# Patient Record
Sex: MALE | Race: WHITE | URBAN - METROPOLITAN AREA
[De-identification: names, ages, dates, MRNs, and addresses within clinical notes are randomized per-mention and may not be internally consistent; named-entity substitution may affect disease eponyms.]

---

## 2021-11-22 ENCOUNTER — OFFICE VISIT (OUTPATIENT)
Dept: CARDIOLOGY CLINIC | Age: 41
End: 2021-11-22
Payer: COMMERCIAL

## 2021-11-22 VITALS
SYSTOLIC BLOOD PRESSURE: 112 MMHG | HEIGHT: 73 IN | HEART RATE: 67 BPM | OXYGEN SATURATION: 97 % | WEIGHT: 233 LBS | BODY MASS INDEX: 30.88 KG/M2 | DIASTOLIC BLOOD PRESSURE: 70 MMHG

## 2021-11-22 DIAGNOSIS — R07.9 CHEST PAIN, UNSPECIFIED TYPE: Primary | ICD-10-CM

## 2021-11-22 PROCEDURE — 93000 ELECTROCARDIOGRAM COMPLETE: CPT | Performed by: INTERNAL MEDICINE

## 2021-11-22 PROCEDURE — 99204 OFFICE O/P NEW MOD 45 MIN: CPT | Performed by: INTERNAL MEDICINE

## 2021-11-22 NOTE — PROGRESS NOTES
[facility]    Mr. Perez [facility] is 69-year-old male with no significant past medical history. He denies any prior history of MI or CHF. Over last 10 years patient has been experiencing repeated episodes of chest pain. Sometimes it feels like it is tight sensation. He has been told in the past that he has costochondritis. This episodes are random and happens at rest.  There is no exertional component. There is no radiation. Sometimes last for seconds sometimes for longer period of time. There is no associated nausea vomiting or diaphoresis. He has been recently seen by GI team and has been told that he may have irritable bowel syndrome. He denies any PND or lower spitty swelling he denies any palpitation, presyncope or syncope  Denies any nausea, vomiting, abdominal pain, fever, chills, sputum production. No hematuria or other bleeding complaints    History reviewed. No pertinent past medical history. Review of Systems:  Cardiac symptoms as noted above in HPI. All others negative. Denies fatigue, malaise, skin rash, joint pain, blurring vision, photophobia, neck pain, hemoptysis, chronic cough, nausea, vomiting, hematuria, burning micturition, BRBPR, chronic headaches. No current outpatient medications on file. No current facility-administered medications for this visit. History reviewed. No pertinent surgical history. Allergies and Sensitivities:  No Known Allergies    Family History:  History reviewed. No pertinent family history. Social History:  Social History     Tobacco Use    Smoking status: Never Smoker    Smokeless tobacco: Never Used   Vaping Use    Vaping Use: Not on file   Substance Use Topics    Alcohol use: Not Currently    Drug use: Not Currently     He  reports that he has never smoked. He has never used smokeless tobacco.  He  reports previous alcohol use.     Physical Exam:  BP Readings from Last 3 Encounters:   21 112/70         Pulse Readings from Last 3 Encounters:   21 67          Wt Readings from Last 3 Encounters:   21 105.7 kg (233 lb)       Constitutional: Oriented to person, place, and time. HENT: Head: Normocephalic and atraumatic. Eyes: Conjunctivae and extraocular motions are normal.   Neck: No JVD present. Carotid bruit is not appreciated. Cardiovascular: Regular rhythm. No murmur, gallop or rubs appreciated  Lung: Breath sounds normal. No respiratory distress. No ronchi or rales appreciated  Abdominal: No tenderness. No rebound and no guarding. Musculoskeletal: There is no lower extremity edema. No cynosis  Lymphadenopathy:  No cervical or supraclavicular adenopathy appriciated. Neurological: No gross motor deficit noted. Skin: No visible skin rash noted. No Ear discharge noted  Psychiatric: Normal mood and affect. LABS:   @No results found for: WBC, WBCLT, HGBPOC, HGB, HGBP, HCTPOC, HCT, PHCT, RBCH, PLT, MCV, HGBEXT, HCTEXT, PLTEXT  No results found for: NA, K, CL, CO2, GLU, BUN, CREA  No flowsheet data found. No results found for: ALT  No results found for: HBA1C, GIN0TFQS, WZR7JAVW  No results found for: TSH, TSH2, TSH3, TSHP, TSHEXT    EK2021: Sinus rhythm at 67 bpm.  No ST changes of ischemia. Normal IA and QRS interval    STRESS TEST (EST, PHARM, NUC, ECHO etc)    CATHETERIZATION    IMPRESSION & PLAN:  Mr. Jaymie Castelan is a 22-year-old male    Chest pain:  Somewhat atypical for angina. Ongoing for more than 10 years and without any exertional symptoms. We will proceed with treadmill stress test for risk stratification  We will proceed with echo to rule out abnormal cardiac structure or pericardial disease  Reassured patient that he has no EKG changes and he has no risk factor to have underlying coronary artery disease. Importance of diet and exercise was discussed with patient.     This plan was discussed with patient who is in agreement. Thank you for allowing me to participate in patient care. Please feel free to call me if you have any question or concern. Theresa Reinoso MD  Please note: This document has been produced using voice recognition software. Unrecognized errors in transcription may be present.

## 2021-11-22 NOTE — LETTER
11/22/2021    Patient: Holden Arroyo   YOB: 1980   Date of Visit: 11/22/2021     Tod Zuñiga MD  [address] [address]  Via Fax: [phone]    Dear Tod Zuñiga MD,      Thank you for referring Mr. Lourdes Dominguez to [address] [place] [address] for evaluation. My notes for this consultation are attached. If you have questions, please do not hesitate to call me. I look forward to following your patient along with you.       Sincerely,    Erich Cedeño MD

## 2022-01-03 ENCOUNTER — TELEPHONE (OUTPATIENT)
Dept: CARDIOLOGY CLINIC | Age: 42
End: 2022-01-03

## 2022-01-04 ENCOUNTER — TELEPHONE (OUTPATIENT)
Dept: CARDIOLOGY CLINIC | Age: 42
End: 2022-01-04

## 2022-02-18 ENCOUNTER — TELEPHONE (OUTPATIENT)
Dept: CARDIOLOGY CLINIC | Age: 42
End: 2022-02-18

## 2022-02-28 ENCOUNTER — OFFICE VISIT (OUTPATIENT)
Dept: CARDIOLOGY CLINIC | Age: 42
End: 2022-02-28
Payer: COMMERCIAL

## 2022-02-28 VITALS
WEIGHT: 237 LBS | HEART RATE: 68 BPM | SYSTOLIC BLOOD PRESSURE: 112 MMHG | BODY MASS INDEX: 31.41 KG/M2 | OXYGEN SATURATION: 98 % | DIASTOLIC BLOOD PRESSURE: 64 MMHG | HEIGHT: 73 IN

## 2022-02-28 DIAGNOSIS — R07.9 CHEST PAIN, UNSPECIFIED TYPE: Primary | ICD-10-CM

## 2022-02-28 PROBLEM — H52.209 ASTIGMATISM: Status: ACTIVE | Noted: 2022-02-28

## 2022-02-28 PROBLEM — R19.5 LOOSE STOOLS: Status: ACTIVE | Noted: 2021-10-20

## 2022-02-28 PROCEDURE — 99213 OFFICE O/P EST LOW 20 MIN: CPT | Performed by: INTERNAL MEDICINE

## 2022-02-28 RX ORDER — DICYCLOMINE HYDROCHLORIDE 20 MG/1
20 TABLET ORAL
COMMUNITY
Start: 2022-02-18 | End: 2022-05-19

## 2022-02-28 RX ORDER — ERGOCALCIFEROL 1.25 MG/1
CAPSULE ORAL
COMMUNITY
Start: 2022-02-03

## 2022-02-28 NOTE — PROGRESS NOTES
Marcia Ramos presents today for   Chief Complaint   Patient presents with    Follow-up     3 months        Marcia Ramos preferred language for health care discussion is english/other. Is someone accompanying this pt? no    Is the patient using any DME equipment during [address]? no    Depression Screening:  3 most recent PHQ Screens 2/28/2022   Little interest or pleasure in doing things Not at all   Feeling down, depressed, irritable, or hopeless Not at all   Total Score PHQ 2 0       Learning Assessment:  Learning Assessment 11/22/2021   PRIMARY LEARNER Patient   HIGHEST LEVEL OF EDUCATION - PRIMARY LEARNER  4 YEARS OF COLLEGE   BARRIERS PRIMARY LEARNER NONE   PRIMARY LANGUAGE ENGLISH   LEARNER PREFERENCE PRIMARY DEMONSTRATION   ANSWERED BY patient   RELATIONSHIP SELF       Abuse Screening:  Abuse Screening Questionnaire 11/22/2021   Do you ever feel afraid of your partner? N   Are you in a relationship with someone who physically or mentally threatens you? N   Is it safe for you to go home? Y       Pt currently taking Anticoagulant therapy? no    Coordination of Care:  1. Have you been to the ER, urgent care clinic since your last visit? Hospitalized since your last visit? no    2. Have you seen or consulted any other health care providers outside of the [address] [place] [address] since your last visit? Include any pap smears or colon screening.  no

## 2022-02-28 NOTE — PROGRESS NOTES
[facility]    Mr. Estephanie Grace is 39 y.o. male with no significant past medical history. He denies any prior history of MI or CHF. Over last 10 years patient has been experiencing repeated episodes of chest pain. Sometimes it feels like it is tight sensation. He has been told in the past that he has costochondritis. This episodes are random and happens at rest.    Patient has undergone echocardiogram and stress test since last visit. No new symptoms since last visit. Able to perform activity without any limitation    No past medical history on file. Review of Systems:  Cardiac symptoms as noted above in HPI. All others negative. Denies fatigue, malaise, skin rash, joint pain, blurring vision, photophobia, neck pain, hemoptysis, chronic cough, nausea, vomiting, hematuria, burning micturition, BRBPR, chronic headaches. Current Outpatient Medications   Medication Sig    dicyclomine (BENTYL) 20 mg tablet Take 20 mg by mouth every six (6) hours as needed.  ergocalciferol (ERGOCALCIFEROL) 1,250 mcg (50,000 unit) capsule TAKE 1 CAPSULE BY MOUTH ONE TIME PER WEEK    Psyllium Husk-Sucrose 3.4 gram/7 gram powd Take 1 Packet by mouth daily. No current facility-administered medications for this visit. No past surgical history on file. Allergies and Sensitivities:  No Known Allergies    Family History:  No family history on file. Social History:  Social History     Tobacco Use    Smoking status: Never Smoker    Smokeless tobacco: Never Used   Vaping Use    Vaping Use: Not on file   Substance Use Topics    Alcohol use: Not Currently    Drug use: Not Currently     He  reports that he has never smoked. He has never used smokeless tobacco.  He  reports previous alcohol use.     Physical Exam:  BP Readings from Last 3 Encounters:   02/28/22 112/64   02/21/22 100/64   01/05/22 100/64         Pulse Readings from Last 3 Encounters:   22 68   21 67          Wt Readings from Last 3 Encounters:   22 107.5 kg (237 lb)   22 105.7 kg (233 lb)   22 105.7 kg (233 lb)       Constitutional: Oriented to person, place, and time. HENT: Head: Normocephalic and atraumatic  Neck: No JVD present. Carotid bruit is not appreciated. Cardiovascular: Regular rhythm. No murmur, gallop or rubs appreciated  Lung: Breath sounds normal. No respiratory distress. No ronchi or rales appreciated  Abdominal: No tenderness. No rebound and no guarding. Musculoskeletal: There is no lower extremity edema. No cynosis    LABS:   @No results found for: WBC, WBCLT, HGBPOC, HGB, HGBP, HCTPOC, HCT, PHCT, RBCH, PLT, MCV, HGBEXT, HCTEXT, PLTEXT, HGBEXT, HCTEXT, PLTEXT  No results found for: NA, K, CL, CO2, GLU, BUN, CREA  No flowsheet data found. No results found for: ALT  No results found for: HBA1C, PRR4CQPK, WOE5BALE, FYH7OSQK  No results found for: TSH, TSH2, TSH3, TSHP, TSHEXT, TSHEXT    EK2021: Sinus rhythm at 67 bpm.  No ST changes of ischemia. Normal [place] and QRS interval    22    ECHO ADULT COMPLETE 2022    Interpretation Summary    Left Ventricle: Left ventricle size is normal. Normal wall thickness. Normal wall motion. Normal left ventricular systolic function with a visually estimated EF of 55 - 60%. Normal diastolic function.   Aorta: Mildly dilated aortic root. Ao Root diameter is 3.7 cm. IMPRESSION & PLAN:  Mr. [place] is a 51-year-old male    Chest pain:   Ongoing for more than 10 years and without any exertional symptoms. Annual stress test in 2022, low risk. Patient exercised for 11 minutes without any chest pain or ST changes of ischemia  Echo in 2022, low risk with normal EF and no wall motion abnormality  Reassured patient that test finding has been low risk. Continue with risk factor modification.   Currently does not appear to have any symptoms concerning for angina    Importance of diet and exercise was discussed with patient. This plan was discussed with patient who is in agreement. Thank you for allowing me to participate in patient care. Please feel free to call me if you have any question or concern. Zohra Grajeda MD  Please note: This document has been produced using voice recognition software. Unrecognized errors in transcription may be present.

## 2022-02-28 NOTE — LETTER
2/28/2022    Patient: Javy Barillas   YOB: 1980   Date of Visit: 2/28/2022     Brittany Silva MD  [address] [address]  Via Fax: [phone]    Dear Brittany Silva MD,      Thank you for referring Mr. Christiano Johnson to [address] [place] [address] for evaluation. My notes for this consultation are attached. If you have questions, please do not hesitate to call me. I look forward to following your patient along with you.       Sincerely,    Conor Zhou MD